# Patient Record
Sex: MALE | Race: OTHER | Employment: FULL TIME | URBAN - METROPOLITAN AREA
[De-identification: names, ages, dates, MRNs, and addresses within clinical notes are randomized per-mention and may not be internally consistent; named-entity substitution may affect disease eponyms.]

---

## 2020-02-23 ENCOUNTER — APPOINTMENT (OUTPATIENT)
Dept: GENERAL RADIOLOGY | Age: 25
End: 2020-02-23
Attending: STUDENT IN AN ORGANIZED HEALTH CARE EDUCATION/TRAINING PROGRAM
Payer: COMMERCIAL

## 2020-02-23 ENCOUNTER — HOSPITAL ENCOUNTER (EMERGENCY)
Age: 25
Discharge: HOME | End: 2020-02-23
Attending: STUDENT IN AN ORGANIZED HEALTH CARE EDUCATION/TRAINING PROGRAM
Payer: COMMERCIAL

## 2020-02-23 ENCOUNTER — APPOINTMENT (OUTPATIENT)
Dept: CT IMAGING | Age: 25
End: 2020-02-23
Attending: STUDENT IN AN ORGANIZED HEALTH CARE EDUCATION/TRAINING PROGRAM
Payer: COMMERCIAL

## 2020-02-23 VITALS
DIASTOLIC BLOOD PRESSURE: 58 MMHG | HEIGHT: 61 IN | SYSTOLIC BLOOD PRESSURE: 113 MMHG | TEMPERATURE: 98.4 F | WEIGHT: 135 LBS | BODY MASS INDEX: 25.49 KG/M2 | RESPIRATION RATE: 21 BRPM | HEART RATE: 123 BPM | OXYGEN SATURATION: 98 %

## 2020-02-23 DIAGNOSIS — E87.20 LACTIC ACIDOSIS: ICD-10-CM

## 2020-02-23 DIAGNOSIS — T68.XXXA HYPOTHERMIA, INITIAL ENCOUNTER: Primary | ICD-10-CM

## 2020-02-23 DIAGNOSIS — F10.929 ALCOHOLIC INTOXICATION WITH COMPLICATION (HCC): ICD-10-CM

## 2020-02-23 LAB
ALBUMIN SERPL-MCNC: 4.3 G/DL (ref 3.5–5)
ALBUMIN/GLOB SERPL: 1.2 {RATIO} (ref 1.2–3.5)
ALP SERPL-CCNC: 68 U/L (ref 50–136)
ALT SERPL-CCNC: 42 U/L (ref 12–65)
AMPHET UR QL SCN: NEGATIVE
ANION GAP SERPL CALC-SCNC: 11 MMOL/L (ref 7–16)
APAP SERPL-MCNC: <2 UG/ML (ref 10–30)
AST SERPL-CCNC: 21 U/L (ref 15–37)
ATRIAL RATE: 54 BPM
BARBITURATES UR QL SCN: NEGATIVE
BASOPHILS # BLD: 0.1 K/UL (ref 0–0.2)
BASOPHILS NFR BLD: 0 % (ref 0–2)
BENZODIAZ UR QL: NEGATIVE
BILIRUB SERPL-MCNC: 0.7 MG/DL (ref 0.2–1.1)
BUN SERPL-MCNC: 14 MG/DL (ref 6–23)
CALCIUM SERPL-MCNC: 8.9 MG/DL (ref 8.3–10.4)
CALCULATED P AXIS, ECG09: 37 DEGREES
CALCULATED R AXIS, ECG10: 78 DEGREES
CALCULATED T AXIS, ECG11: 41 DEGREES
CANNABINOIDS UR QL SCN: NEGATIVE
CHLORIDE SERPL-SCNC: 107 MMOL/L (ref 98–107)
CO2 SERPL-SCNC: 22 MMOL/L (ref 21–32)
COCAINE UR QL SCN: NEGATIVE
CREAT SERPL-MCNC: 1.21 MG/DL (ref 0.8–1.5)
DIAGNOSIS, 93000: NORMAL
DIFFERENTIAL METHOD BLD: ABNORMAL
EOSINOPHIL # BLD: 0.2 K/UL (ref 0–0.8)
EOSINOPHIL NFR BLD: 1 % (ref 0.5–7.8)
ERYTHROCYTE [DISTWIDTH] IN BLOOD BY AUTOMATED COUNT: 12.4 % (ref 11.9–14.6)
ETHANOL SERPL-MCNC: 184 MG/DL
GLOBULIN SER CALC-MCNC: 3.6 G/DL (ref 2.3–3.5)
GLUCOSE SERPL-MCNC: 148 MG/DL (ref 65–100)
HCT VFR BLD AUTO: 45.6 % (ref 41.1–50.3)
HGB BLD-MCNC: 15.6 G/DL (ref 13.6–17.2)
IMM GRANULOCYTES # BLD AUTO: 0.1 K/UL (ref 0–0.5)
IMM GRANULOCYTES NFR BLD AUTO: 1 % (ref 0–5)
LACTATE SERPL-SCNC: 1.6 MMOL/L (ref 0.4–2)
LACTATE SERPL-SCNC: 3.1 MMOL/L (ref 0.4–2)
LYMPHOCYTES # BLD: 4.7 K/UL (ref 0.5–4.6)
LYMPHOCYTES NFR BLD: 34 % (ref 13–44)
MAGNESIUM SERPL-MCNC: 2.3 MG/DL (ref 1.8–2.4)
MCH RBC QN AUTO: 29 PG (ref 26.1–32.9)
MCHC RBC AUTO-ENTMCNC: 34.2 G/DL (ref 31.4–35)
MCV RBC AUTO: 84.8 FL (ref 79.6–97.8)
METHADONE UR QL: NEGATIVE
MONOCYTES # BLD: 1.1 K/UL (ref 0.1–1.3)
MONOCYTES NFR BLD: 8 % (ref 4–12)
NEUTS SEG # BLD: 7.9 K/UL (ref 1.7–8.2)
NEUTS SEG NFR BLD: 56 % (ref 43–78)
NRBC # BLD: 0 K/UL (ref 0–0.2)
OPIATES UR QL: NEGATIVE
P-R INTERVAL, ECG05: 142 MS
PCP UR QL: NEGATIVE
PLATELET # BLD AUTO: 435 K/UL (ref 150–450)
PMV BLD AUTO: 10.4 FL (ref 9.4–12.3)
POTASSIUM SERPL-SCNC: 3.2 MMOL/L (ref 3.5–5.1)
PROT SERPL-MCNC: 7.9 G/DL (ref 6.3–8.2)
Q-T INTERVAL, ECG07: 408 MS
QRS DURATION, ECG06: 94 MS
QTC CALCULATION (BEZET), ECG08: 386 MS
RBC # BLD AUTO: 5.38 M/UL (ref 4.23–5.6)
SALICYLATES SERPL-MCNC: <1.7 MG/DL (ref 2.8–20)
SODIUM SERPL-SCNC: 140 MMOL/L (ref 136–145)
TSH SERPL DL<=0.005 MIU/L-ACNC: 1.77 UIU/ML (ref 0.36–3.74)
VENTRICULAR RATE, ECG03: 54 BPM
WBC # BLD AUTO: 14 K/UL (ref 4.3–11.1)

## 2020-02-23 PROCEDURE — 96366 THER/PROPH/DIAG IV INF ADDON: CPT

## 2020-02-23 PROCEDURE — 71045 X-RAY EXAM CHEST 1 VIEW: CPT

## 2020-02-23 PROCEDURE — 74011250636 HC RX REV CODE- 250/636: Performed by: EMERGENCY MEDICINE

## 2020-02-23 PROCEDURE — 74011000258 HC RX REV CODE- 258: Performed by: STUDENT IN AN ORGANIZED HEALTH CARE EDUCATION/TRAINING PROGRAM

## 2020-02-23 PROCEDURE — 72125 CT NECK SPINE W/O DYE: CPT

## 2020-02-23 PROCEDURE — 93005 ELECTROCARDIOGRAM TRACING: CPT | Performed by: STUDENT IN AN ORGANIZED HEALTH CARE EDUCATION/TRAINING PROGRAM

## 2020-02-23 PROCEDURE — 85025 COMPLETE CBC W/AUTO DIFF WBC: CPT

## 2020-02-23 PROCEDURE — 96375 TX/PRO/DX INJ NEW DRUG ADDON: CPT

## 2020-02-23 PROCEDURE — 80053 COMPREHEN METABOLIC PANEL: CPT

## 2020-02-23 PROCEDURE — 74011250636 HC RX REV CODE- 250/636

## 2020-02-23 PROCEDURE — 70450 CT HEAD/BRAIN W/O DYE: CPT

## 2020-02-23 PROCEDURE — 83735 ASSAY OF MAGNESIUM: CPT

## 2020-02-23 PROCEDURE — 96365 THER/PROPH/DIAG IV INF INIT: CPT

## 2020-02-23 PROCEDURE — 74011250636 HC RX REV CODE- 250/636: Performed by: STUDENT IN AN ORGANIZED HEALTH CARE EDUCATION/TRAINING PROGRAM

## 2020-02-23 PROCEDURE — 99285 EMERGENCY DEPT VISIT HI MDM: CPT

## 2020-02-23 PROCEDURE — 84443 ASSAY THYROID STIM HORMONE: CPT

## 2020-02-23 PROCEDURE — 83605 ASSAY OF LACTIC ACID: CPT

## 2020-02-23 PROCEDURE — 80307 DRUG TEST PRSMV CHEM ANLYZR: CPT

## 2020-02-23 PROCEDURE — 96367 TX/PROPH/DG ADDL SEQ IV INF: CPT

## 2020-02-23 RX ORDER — NALOXONE HYDROCHLORIDE 1 MG/ML
2 INJECTION INTRAMUSCULAR; INTRAVENOUS; SUBCUTANEOUS
Status: COMPLETED | OUTPATIENT
Start: 2020-02-23 | End: 2020-02-23

## 2020-02-23 RX ORDER — ONDANSETRON 2 MG/ML
INJECTION INTRAMUSCULAR; INTRAVENOUS
Status: COMPLETED
Start: 2020-02-23 | End: 2020-02-23

## 2020-02-23 RX ORDER — ONDANSETRON 2 MG/ML
8 INJECTION INTRAMUSCULAR; INTRAVENOUS
Status: COMPLETED | OUTPATIENT
Start: 2020-02-23 | End: 2020-02-23

## 2020-02-23 RX ORDER — NALOXONE HYDROCHLORIDE 1 MG/ML
INJECTION INTRAMUSCULAR; INTRAVENOUS; SUBCUTANEOUS
Status: DISCONTINUED
Start: 2020-02-23 | End: 2020-02-23 | Stop reason: HOSPADM

## 2020-02-23 RX ORDER — VANCOMYCIN/0.9 % SOD CHLORIDE 1.5G/250ML
1500 PLASTIC BAG, INJECTION (ML) INTRAVENOUS ONCE
Status: COMPLETED | OUTPATIENT
Start: 2020-02-23 | End: 2020-02-23

## 2020-02-23 RX ADMIN — SODIUM CHLORIDE 4.5 G: 900 INJECTION, SOLUTION INTRAVENOUS at 03:54

## 2020-02-23 RX ADMIN — ONDANSETRON 8 MG: 2 INJECTION INTRAMUSCULAR; INTRAVENOUS at 02:16

## 2020-02-23 RX ADMIN — SODIUM CHLORIDE 1000 ML: 900 INJECTION, SOLUTION INTRAVENOUS at 03:54

## 2020-02-23 RX ADMIN — SODIUM CHLORIDE 1000 ML: 900 INJECTION, SOLUTION INTRAVENOUS at 02:13

## 2020-02-23 RX ADMIN — NALOXONE HYDROCHLORIDE 2 MG: 1 INJECTION PARENTERAL at 02:10

## 2020-02-23 RX ADMIN — VANCOMYCIN HYDROCHLORIDE 1500 MG: 10 INJECTION, POWDER, LYOPHILIZED, FOR SOLUTION INTRAVENOUS at 04:45

## 2020-02-23 NOTE — DISCHARGE INSTRUCTIONS
Patient Education   Drink plenty of clear liquids to ensure hydration. Arrange follow-up care with your primary care provider or 1 of the providers listed. Return immediately for worsening symptoms, concerns or questions. Hypothermia: Care Instructions  Your Care Instructions  Hypothermia means that your body loses heat faster than it can make heat. You can get it if you spend time in cold air, water, wind, or rain. Most healthy people with mild to moderate hypothermia fully recover. And they don't have lasting problems. But babies and older or sick adults may be more at risk for hypothermia. This is because their bodies do not control temperature as well. Make sure to follow your doctor's instructions for how to get better. It's also important to learn how to protect yourself from hypothermia in the future. Follow-up care is a key part of your treatment and safety. Be sure to make and go to all appointments, and call your doctor if you are having problems. It's also a good idea to know your test results and keep a list of the medicines you take. How can you care for yourself at home? · Take your medicines exactly as prescribed. Call your doctor if you think you are having a problem with your medicine. · To prevent dehydration, drink plenty of fluids, enough so that your urine is light yellow or clear like water. Choose water and other caffeine-free clear liquids until you feel better. If you have kidney, heart, or liver disease and have to limit fluids, talk with your doctor before you increase the amount of fluids you drink. · Get a lot of rest at home, and stay warm. To prevent hypothermia  · Avoid illegal drugs and too much alcohol. They can make you more likely to get hypothermia. · Cover your head, hands, and feet in cold or wet weather. · Try not to sweat a lot if you are out in the cold. · Stay as dry as possible. · Wear layers of loose-fitting clothing.   · Pack a kit in your car that has items you will need to stay warm. It may include fire-starting kits and a cigarette lighter, extra clothing, drinking water, and food. You also can bring a sleeping bag. Two people can warm up more easily by sharing the bag. If you see symptoms in someone who has been in cold weather, keep the person warm and dry and get help quickly. Symptoms include shivering, cold and pale skin, and slurred speech. When should you call for help? Call your doctor now or seek immediate medical care if:    · You are confused or have trouble thinking.     · You are shivering and cannot stop.     · You have signs of needing more fluids. You have sunken eyes and a dry mouth, and you pass only a little dark urine.    Watch closely for changes in your health, and be sure to contact your doctor if:    · You do not get better as expected. Where can you learn more? Go to http://willie-quincy.info/. Enter U848 in the search box to learn more about \"Hypothermia: Care Instructions. \"  Current as of: June 26, 2019  Content Version: 12.2  © 1229-7597 [employer]. Care instructions adapted under license by [employer] (which disclaims liability or warranty for this information). If you have questions about a medical condition or this instruction, always ask your healthcare professional. [address] any warranty or liability for your use of this information.

## 2020-02-23 NOTE — ED PROVIDER NOTES
50-year-old male patient arrives via POV with his friend who states he was out this evening at a party. Friend states that he lost track of patient, found him outside on the ground covered in his own vomit. Unclear palpation ended up there. Patient minimally responsive on arrival.  Able to answer brief questions with sternal rub including his name. No other information available at this time. The history is provided by the patient. No  was used. No past medical history on file. No past surgical history on file. No family history on file.     Social History     Socioeconomic History    Marital status: SINGLE     Spouse name: Not on file    Number of children: Not on file    Years of education: Not on file    Highest education level: Not on file   Occupational History    Not on file   Social Needs    Financial resource strain: Not on file    Food insecurity:     Worry: Not on file     Inability: Not on file    Transportation needs:     Medical: Not on file     Non-medical: Not on file   Tobacco Use    Smoking status: Not on file   Substance and Sexual Activity    Alcohol use: Not on file    Drug use: Not on file    Sexual activity: Not on file   Lifestyle    Physical activity:     Days per week: Not on file     Minutes per session: Not on file    Stress: Not on file   Relationships    Social connections:     Talks on phone: Not on file     Gets together: Not on file     Attends Muslim service: Not on file     Active member of club or organization: Not on file     Attends meetings of clubs or organizations: Not on file     Relationship status: Not on file    Intimate partner violence:     Fear of current or ex partner: Not on file     Emotionally abused: Not on file     Physically abused: Not on file     Forced sexual activity: Not on file   Other Topics Concern    Not on file   Social History Narrative    Not on file         ALLERGIES: Patient has no known allergies. Review of Systems   Unable to perform ROS: Mental status change       Vitals:    02/23/20 0501 02/23/20 0521 02/23/20 0540 02/23/20 0601   BP: 126/63 118/59 120/58 113/57   Pulse: (!) 109 86 (!) 104 89   Resp: 22 17 17 16   Temp:       SpO2: 100% 99% 98% 98%   Weight:       Height:                Physical Exam  Vitals signs and nursing note reviewed. Constitutional:       General: He is not in acute distress. Appearance: He is well-developed. He is not diaphoretic. Comments: Minimally responsive on initial evaluation, answers questions with vigorous sternal rub. Able to tell me his name. No other information provided. Lethargic, appears intoxicated. Protecting airway, no evidence of tachypnea or other respiratory distress. HENT:      Head: Normocephalic and atraumatic. Comments: No signs of obvious trauma about the head or face. No raccoon eyes, arciniega sign or hemotympanum. Right Ear: Tympanic membrane, ear canal and external ear normal.      Left Ear: Tympanic membrane, ear canal and external ear normal.      Nose: Nose normal.   Eyes:      Pupils: Pupils are equal, round, and reactive to light. Neck:      Musculoskeletal: Normal range of motion. Cardiovascular:      Rate and Rhythm: Normal rate and regular rhythm. Heart sounds: Normal heart sounds. No murmur. No friction rub. No gallop. Pulmonary:      Effort: Pulmonary effort is normal. No respiratory distress. Breath sounds: Normal breath sounds. No stridor. No decreased breath sounds, wheezing, rhonchi or rales. Chest:      Chest wall: No tenderness. Abdominal:      General: There is no distension. Palpations: Abdomen is soft. There is no mass. Tenderness: There is no abdominal tenderness. There is no guarding or rebound. Hernia: No hernia is present. Musculoskeletal: Normal range of motion. General: No tenderness or deformity. Skin:     General: Skin is warm and dry. Comments: Cool to touch. Neurological:      Mental Status: He is lethargic. GCS: GCS eye subscore is 2. GCS verbal subscore is 5. GCS motor subscore is 5. Cranial Nerves: No cranial nerve deficit. Comments: No obvious cranial nerve deficits. Patient localizes pain with sternal rub. He is able to respond to questioning with appropriate responses. He appears intoxicated and sedate on exam.          MDM  Number of Diagnoses or Management Options  Alcoholic intoxication with complication [facility]): new and requires workup  Hypothermia, initial encounter: new and requires workup  Lactic acidosis: new and requires workup  Diagnosis management comments: No significant response to Narcan. Patient given 8 of Zofran on arrival.  Recurrence of dry heaving, however, nausea appears to be controlled for the time being. He stable oxygen saturations nasal cannula. Orders placed for labs, CT imaging of the head neck, chest x-ray, EKG    Temp of 95 degrees. Patient is cool to touch. Orders placed for rewarming via bear hugger and warm blankets. Suspect exposure as a cause of this hypothermia as patient was discovered outside lying on the ground. He continues to wake to voice and answer short questions. Vomiting improved. Appears slightly more alert than he did on arrival after Narcan administration. Laboratory evaluation thus far is essentially unremarkable aside from slight hypokalemia. Patient's drug screen is clear. Salicylates and Tylenol are negative. Is within normal limits. Alcohol is elevated at 184. CT imaging of the head and neck are unremarkable. Patient's vital signs are normalizing and his temperature is much improved. He is slightly tachycardic. Lactic acid of unclear significance. Orders placed for broad-spectrum antibiotics after arrival.    Patient is much more alert at this time and states that he was drinking only. Denies any other ingestion. Denies pain at this time. Discussed plan to continue to hydrate, rewarm and reassess. Patient voices understanding and agreement. At this time, given patient's significant improvement I do not feel that he requires admission assuming his lactic acid resolves. He does have a slight leukocytosis and moderate lactic acidosis however my suspicion for sepsis is low. Suspect these findings are secondary to stress reaction, recurrent nausea and vomiting. Patient has no identifiable source of infection at this time. Voice dictation software was used during the making of this note. This software is not perfect and grammatical and other typographical errors may be present. This note has been proofread, but may still contain errors.   [address] Doctor [facility], ; 2/23/2020 @5:04 AM   ===================================================================         Amount and/or Complexity of Data Reviewed  Clinical lab tests: ordered and reviewed  Tests in the radiology section of CPT®: ordered and reviewed  Tests in the medicine section of CPT®: ordered and reviewed  Independent visualization of images, tracings, or specimens: yes    Risk of Complications, Morbidity, and/or Mortality  Presenting problems: high  Diagnostic procedures: high  Management options: high    Patient Progress  Patient progress: stable         Procedures

## 2020-02-23 NOTE — ED TRIAGE NOTES
Pt arrived via friend and assisted in the bay to a room. Pt was found in the car by this RN and pt was unresponsive. MD called to bedside. Per the friend that brought the pt, they were out at the bars and pt was found outside, alert and vomiting. Pt was able to walk to the car with the friend and became unresponsive. Friend states that he mainly drank beer around 2200. Pt placed on a non rebreather, monitor, given zofran and narcan. Pt not responsive. Rectal temp at 95.3, placed on bearhugger per MD. Pt gagging and suctioned, placed in gown. Pt able to move and open eyes and only responsive to orbital pinch.

## 2020-12-07 NOTE — ED NOTES
E-prescription confirmation received. Patient was informed and reminded to call earlier for refills.   I have reviewed discharge instructions with the patient. The patient verbalized understanding. Patient left ED via Discharge Method: ambulatory to Home with friend. Opportunity for questions and clarification provided. Patient given 0 scripts. Pt in no acute distress at time of d/c        To continue your aftercare when you leave the hospital, you may receive an automated call from our care team to check in on how you are doing. This is a free service and part of our promise to provide the best care and service to meet your aftercare needs.  If you have questions, or wish to unsubscribe from this service please call [phone]. Thank you for Choosing our [facility] Emergency Department. Medication refill information reviewed.     Due date for fentaNYL (DURAGESIC) 12 mcg/hr 72 hr patch  is 12/7/20     Prescriptions prepped for review.     Will route to provider.            Patient requesting refill(s) of  fentaNYL (DURAGESIC) 12 mcg/hr 72 hr patch     Last dispensed from pharmacy on 11/03/2020    Pt last seen by prescribing provider on 10/12/2020  Next appt scheduled for 12/11/2020     checked in the past 6 months? Yes If no, print current report and give to RN    Last urine drug screen date 10/12/2020  Current opioid agreement on file (completed within the last year) Yes Date of opioid agreement: 10/12/2020    E-prescribe to [facility] [id number]     Will route to nursing pool for review and preparation of prescription(s).          Please remind her to call 1 week in advance    Script Eprescribed to pharmacy    Will send this to MA team to notify patient.    Signed Prescriptions:                        Disp   Refills    fentaNYL (DURAGESIC) 12 mcg/hr 72 hr patch 10 pat*0        Sig: Place 1 patch onto the skin every 72 hours remove old           patch. Fill 12/7/20 to start 12/7/20  Authorizing Provider: SANTOS ISLAS MD  [facility]    yes